# Patient Record
Sex: MALE | Race: WHITE | NOT HISPANIC OR LATINO | URBAN - METROPOLITAN AREA
[De-identification: names, ages, dates, MRNs, and addresses within clinical notes are randomized per-mention and may not be internally consistent; named-entity substitution may affect disease eponyms.]

---

## 2023-03-10 ENCOUNTER — TELEPHONE (OUTPATIENT)
Dept: PRIMARY CARE CLINIC | Facility: CLINIC | Age: 73
End: 2023-03-10
Payer: COMMERCIAL

## 2023-03-10 NOTE — TELEPHONE ENCOUNTER
----- Message from Rachele Scruggs sent at 3/10/2023  8:03 AM CST -----  Contact: Jose  Patient is calling to speak with the nurse regarding appt. Reports would like a sooner appt if possible. Please give patient a call back at .[phone].

## 2023-03-10 NOTE — TELEPHONE ENCOUNTER
----- Message from Bailey Marx sent at 3/10/2023  1:41 PM CST -----  .Type:  Patient Returning Call    Who Called:.Jose Zamora   Who Left Message for Patient:  Does the patient know what this is regarding?:  Would the patient rather a call back or a response via [facility]? Call back  Best Call Back Number:.[phone]   Additional Information:

## 2023-04-04 ENCOUNTER — HOSPITAL ENCOUNTER (OUTPATIENT)
Dept: RADIOLOGY | Facility: HOSPITAL | Age: 73
Discharge: HOME | End: 2023-04-04
Attending: FAMILY MEDICINE
Payer: COMMERCIAL

## 2023-04-04 DIAGNOSIS — Z01.818 ENCOUNTER FOR OTHER PREPROCEDURAL EXAMINATION: ICD-10-CM

## 2023-04-04 PROCEDURE — 71046 XR CHEST PA AND LATERAL: ICD-10-PCS | Mod: 26,,, | Performed by: RADIOLOGY

## 2023-04-04 PROCEDURE — 71046 X-RAY EXAM CHEST 2 VIEWS: CPT | Mod: TC,PN

## 2023-04-04 PROCEDURE — 71046 X-RAY EXAM CHEST 2 VIEWS: CPT | Mod: 26,,, | Performed by: RADIOLOGY

## 2023-07-14 ENCOUNTER — OFFICE VISIT (OUTPATIENT)
Dept: PRIMARY CARE CLINIC | Facility: CLINIC | Age: 73
End: 2023-07-14
Payer: COMMERCIAL

## 2023-07-14 VITALS
SYSTOLIC BLOOD PRESSURE: 130 MMHG | HEIGHT: 68 IN | WEIGHT: 149.94 LBS | RESPIRATION RATE: 18 BRPM | BODY MASS INDEX: 22.72 KG/M2 | HEART RATE: 60 BPM | TEMPERATURE: 98 F | OXYGEN SATURATION: 97 % | DIASTOLIC BLOOD PRESSURE: 82 MMHG

## 2023-07-14 DIAGNOSIS — K90.0 CELIAC DISEASE: Primary | ICD-10-CM

## 2023-07-14 DIAGNOSIS — I25.10 CORONARY ARTERY DISEASE INVOLVING NATIVE HEART WITHOUT ANGINA PECTORIS, UNSPECIFIED VESSEL OR LESION TYPE: ICD-10-CM

## 2023-07-14 DIAGNOSIS — Z85.850 HISTORY OF PAPILLARY ADENOCARCINOMA OF THYROID: ICD-10-CM

## 2023-07-14 DIAGNOSIS — K86.81 EXOCRINE PANCREATIC INSUFFICIENCY: ICD-10-CM

## 2023-07-14 DIAGNOSIS — R74.8 ELEVATED CPK: ICD-10-CM

## 2023-07-14 DIAGNOSIS — R79.89 ELEVATED TESTOSTERONE LEVEL: ICD-10-CM

## 2023-07-14 DIAGNOSIS — E78.2 MIXED HYPERLIPIDEMIA: ICD-10-CM

## 2023-07-14 DIAGNOSIS — D64.9 ANEMIA, UNSPECIFIED TYPE: ICD-10-CM

## 2023-07-14 DIAGNOSIS — E89.0 POSTOPERATIVE HYPOTHYROIDISM: ICD-10-CM

## 2023-07-14 PROCEDURE — 1125F AMNT PAIN NOTED PAIN PRSNT: CPT | Mod: CPTII,S$GLB,, | Performed by: FAMILY MEDICINE

## 2023-07-14 PROCEDURE — 1125F PR PAIN SEVERITY QUANTIFIED, PAIN PRESENT: ICD-10-PCS | Mod: CPTII,S$GLB,, | Performed by: FAMILY MEDICINE

## 2023-07-14 PROCEDURE — 99999 PR PBB SHADOW E&M-EST. PATIENT-LVL III: CPT | Mod: PBBFAC,,, | Performed by: FAMILY MEDICINE

## 2023-07-14 PROCEDURE — 3079F DIAST BP 80-89 MM HG: CPT | Mod: CPTII,S$GLB,, | Performed by: FAMILY MEDICINE

## 2023-07-14 PROCEDURE — 3075F SYST BP GE 130 - 139MM HG: CPT | Mod: CPTII,S$GLB,, | Performed by: FAMILY MEDICINE

## 2023-07-14 PROCEDURE — 3008F PR BODY MASS INDEX (BMI) DOCUMENTED: ICD-10-PCS | Mod: CPTII,S$GLB,, | Performed by: FAMILY MEDICINE

## 2023-07-14 PROCEDURE — 1159F PR MEDICATION LIST DOCUMENTED IN MEDICAL RECORD: ICD-10-PCS | Mod: CPTII,S$GLB,, | Performed by: FAMILY MEDICINE

## 2023-07-14 PROCEDURE — 1159F MED LIST DOCD IN RCRD: CPT | Mod: CPTII,S$GLB,, | Performed by: FAMILY MEDICINE

## 2023-07-14 PROCEDURE — 3079F PR MOST RECENT DIASTOLIC BLOOD PRESSURE 80-89 MM HG: ICD-10-PCS | Mod: CPTII,S$GLB,, | Performed by: FAMILY MEDICINE

## 2023-07-14 PROCEDURE — 3008F BODY MASS INDEX DOCD: CPT | Mod: CPTII,S$GLB,, | Performed by: FAMILY MEDICINE

## 2023-07-14 PROCEDURE — 99205 OFFICE O/P NEW HI 60 MIN: CPT | Mod: S$GLB,,, | Performed by: FAMILY MEDICINE

## 2023-07-14 PROCEDURE — 3075F PR MOST RECENT SYSTOLIC BLOOD PRESS GE 130-139MM HG: ICD-10-PCS | Mod: CPTII,S$GLB,, | Performed by: FAMILY MEDICINE

## 2023-07-14 PROCEDURE — 99999 PR PBB SHADOW E&M-EST. PATIENT-LVL III: ICD-10-PCS | Mod: PBBFAC,,, | Performed by: FAMILY MEDICINE

## 2023-07-14 PROCEDURE — 99205 PR OFFICE/OUTPT VISIT, NEW, LEVL V, 60-74 MIN: ICD-10-PCS | Mod: S$GLB,,, | Performed by: FAMILY MEDICINE

## 2023-07-14 RX ORDER — METOPROLOL TARTRATE 25 MG/1
12.5 TABLET, FILM COATED ORAL
COMMUNITY
Start: 2023-04-26

## 2023-07-14 RX ORDER — LEVOTHYROXINE SODIUM 13 UG/1
1 CAPSULE ORAL
COMMUNITY
Start: 2023-06-19

## 2023-07-14 NOTE — ASSESSMENT & PLAN NOTE
Last LDL was 130, certainly agree with getting the advanced lipids to further determine need for medication

## 2023-07-14 NOTE — ASSESSMENT & PLAN NOTE
Recently diagnosed, certainly think it is okay for him to take it the pancreatic enzymes but if he feels more comfortable starting with over-the-counter, you certainly can try that.  It is concerning that he is now having a low anemia which questions that he is having more malabsorptive type of symptoms.

## 2023-07-14 NOTE — ASSESSMENT & PLAN NOTE
Question son or dehydration effect versus red rice yeast.  Patient has been off the supplement for over a month, we will repeat labs

## 2023-07-14 NOTE — PROGRESS NOTES
"    [facility]       [address] [place] [address]      [phone]       [phone]     An Vasquez MD         .      Office Visit  07/14/2023  [id number]      SUBJECTIVE     HPI:  Jose Zamora is a 73 y.o. male presents today in clinic for "Establish Care  ."   Patient is here to establish care with me as a new patient.  I currently have seen his wife and daughter.  Has a long history of coronary disease status post double bypass.  This was found incidentally after he had surgery to remove his thyroid secondary to papillary thyroid cancer which appeared to be metastatic and aggressive.  He has tried repeatedly to take statin medications but has not been able to tolerate.  He recently started on red rice yeast and after a month, noticed you had more leg cramping and upon lab testing, it was noted that he had elevated CPK levels.  He is a runner and does stay very active so we can question if this was more related to dehydration and heat exhaustion? .  Patient is wanting a 2nd opinion on his overall cardiovascular health.  He brought in labs from his old doctor for me to review.  He eats very clean as he has celiac disease and stays on a gluten free diet.  Lab IgA testing confirms he is not getting any gluten in his diet.  He had started with some loose stools that is started a few months ago, and upon testing, it was found that he had low pancreatic elastase and was started on Creon.  Patient was a little reluctant to start, so he has not started yet.  He does think maybe some foods trigger this.  He was also shown to be slightly anemic which has not been an issue previously.  No further evaluation was done at that point.  He is fasting today and would like to get labs      ROS   Review of symptoms are negative except as noted.     PAST MEDICAL HISTORY     Past Medical History:   Diagnosis Date    Celiac disease     Coronary artery disease     Hypothyroidism  " "      Past Surgical History:   Procedure Laterality Date    ARTERIAL BYPASS SURGRY  2017    SHOULDER SURGERY Right 04/2023    THYROIDECTOMY  2016    TUMOR REMOVAL  2003    stomach       Social History     Socioeconomic History    Marital status:    Tobacco Use    Smoking status: Never     Passive exposure: Never    Smokeless tobacco: Never   Substance and Sexual Activity    Alcohol use: Yes    Drug use: Never       Allergies as of 07/14/2023 - Reviewed 07/14/2023   Allergen Reaction Noted    Statins-hmg-coa reductase inhibitors Other (See Comments) 04/22/2021    Gluten Diarrhea 10/26/2016       HOME MEDS     Current Outpatient Medications   Medication Instructions    metoprolol tartrate (LOPRESSOR) 12.5 mg, Oral    TIROSINT 150 mcg Cap 1 capsule, Oral       The following were updated and reviewed by myself in the chart: medications, past medical history, past surgical history, family history, social history, and allergies.        Objective    OBJECTIVE   /82   Pulse 60   Temp 97.9 °F (36.6 °C)   Resp 18   Ht 5' 8" (1.727 m)   Wt 68 kg (149 lb 14.6 oz)   SpO2 97%   BMI 22.79 kg/m²     Wt Readings from Last 2 Encounters:   07/14/23 68 kg (149 lb 14.6 oz)       BP Readings from Last 2 Encounters:   07/14/23 130/82          Physical Exam  Vitals and nursing note reviewed.   Constitutional:       Appearance: Normal appearance. He is normal weight.   HENT:      Head: Normocephalic and atraumatic.      Nose: Nose normal.      Mouth/Throat:      Mouth: Mucous membranes are dry.   Eyes:      General: Lids are normal.      Conjunctiva/sclera: Conjunctivae normal.      Pupils: Pupils are equal, round, and reactive to light.   Neck:      Thyroid: No thyromegaly or thyroid tenderness.   Cardiovascular:      Rate and Rhythm: Normal rate and regular rhythm.      Pulses: Normal pulses.   Pulmonary:      Effort: Pulmonary effort is normal.      Breath sounds: Normal breath sounds.   Abdominal:      General: " Abdomen is flat.      Palpations: Abdomen is soft.      Tenderness: There is no abdominal tenderness.   Musculoskeletal:         General: Normal range of motion.      Cervical back: Normal range of motion and neck supple. No muscular tenderness.   Skin:     General: Skin is warm and dry.      Findings: No lesion or rash.   Neurological:      General: No focal deficit present.      Mental Status: He is alert and oriented to person, place, and time.      Cranial Nerves: Cranial nerves 2-12 are intact.      Sensory: Sensation is intact.      Motor: Motor function is intact.      Coordination: Coordination is intact.      Gait: Gait is intact.   Psychiatric:         Attention and Perception: Attention normal. He is attentive.         Mood and Affect: Mood normal. Mood is not anxious or depressed. Affect is not tearful.         Speech: Speech is not rapid and pressured.         Behavior: Behavior normal. Behavior is not slowed or hyperactive.             LABS      LAB RESULTS, IF AVAILABLE, ARE DISCUSSED WITH PATIENT AND POSTED TO PATIENT PORTAL     ASSESSMENT & PLAN     1. Celiac disease  Assessment & Plan:  Adheres to a gluten free diet.  IgA antibodies were negative      2. Coronary artery disease involving native heart without angina pectoris, unspecified vessel or lesion type  Assessment & Plan:  Now seeing Dr. López, we will get advanced lipids with inflammatory markers to determine his best need for medication and treatment    Orders:  -     Misc Sendout Test, Blood Advanced Lipid Panel, Cardio IQ ® 16207; Future; Expected date: 07/14/2023  -     Misc Sendout Test, Blood Cardio IQ hs-CRP 30067; Future; Expected date: 07/14/2023  -     Cardio Iq(R) Apolipoprotein B; Future; Expected date: 07/14/2023  -     Misc Sendout Test, Blood Cardio IQ® Lipoprotein (a) 32586; Future; Expected date: 07/14/2023    3. Elevated CPK  Assessment & Plan:  Question son or dehydration effect versus red rice yeast.  Patient has been  off the supplement for over a month, we will repeat labs    Orders:  -     CK; Future; Expected date: 07/14/2023    4. History of papillary adenocarcinoma of thyroid  Overview:  Status post removal in 2016 in [place]      5. Postoperative hypothyroidism  Overview:  Patient advised to comply with thyroid medications as directed. Patient should take thyroid meds on empty stomach and avoid taking with iron, calcium, zinc and fiber.  Potential risks associated with suppressing TSH (increased arrhythmia and bone loss) can occur as a result of thyroid replacement therapy. Emphasis is  on improving patient symptoms. Patient should notify us if any symptoms occur suggestive of overactive thyroid (fast heart rate, anxiety, sleeplessness, and tremors). Routine follow up recommended       Assessment & Plan:  We will look at entire incomplete thyroid lab.  Previous antibody tests were normal.    Orders:  -     TSH; Future; Expected date: 07/14/2023  -     T4, Free; Future; Expected date: 07/14/2023  -     T3, Free; Future; Expected date: 07/14/2023    6. Mixed hyperlipidemia  Overview:  Reviewed importance of advanced lipid profiles. Advise daily exercise. Diet is recommended. Patients are encouraged to obtain healthy BMI weight. Risks associated with high cholesterol are well established and include but are not limited to heart disease, stroke and peripheral vascular disease. Patient should not smoke. Goal LDL particle number is <1000 and ApoB <70. Basic LDL is below 100 or below 70 if diabetic. Some non-FDA approved dietary supplements that may be beneficial to patient include but is not limited to high fiber diet at least 30g daily; niacin ER non flush free variety 500mg-1,000mg; Omega-3 fish oil DHA+EPA = 1,000mg twice daily; baby dose aspirin 81mg; co-enzyme q10 500mg to help reduce statin-induced myalgia; red rice yeast 1200mg twice daily; berberine 1000mg-2000mg daily. Patient is encouraged to exercise routinely and adhere  to heart healthy diet with Mediterranean diet showing the most consistent data to help with lipid management.      Assessment & Plan:  Last LDL was 130, certainly agree with getting the advanced lipids to further determine need for medication    Orders:  -     Misc Sendout Test, Blood Advanced Lipid Panel, Cardio IQ ® 38799; Future; Expected date: 07/14/2023  -     Misc Sendout Test, Blood Cardio IQ hs-CRP 29398; Future; Expected date: 07/14/2023  -     Cardio Iq(R) Apolipoprotein B; Future; Expected date: 07/14/2023  -     Misc Sendout Test, Blood Cardio IQ® Lipoprotein (a) 74970; Future; Expected date: 07/14/2023  -     CBC Auto Differential; Future; Expected date: 07/14/2023    7. Exocrine pancreatic insufficiency  Assessment & Plan:  Recently diagnosed, certainly think it is okay for him to take it the pancreatic enzymes but if he feels more comfortable starting with over-the-counter, you certainly can try that.  It is concerning that he is now having a low anemia which questions that he is having more malabsorptive type of symptoms.    Orders:  -     Insulin, Random; Future; Expected date: 07/14/2023  -     Hemoglobin A1C; Future; Expected date: 07/14/2023    8. Elevated testosterone level  Assessment & Plan:  Last testosterone was over 900 which is unusually elevated given his age.  We will get free and total along with SHBG.  He is taking a testosterone booster supplement    Orders:  -     Testosterone, Free; Future; Expected date: 07/14/2023  -     Testosterone, Total, LC/MS/MS; Future; Expected date: 07/14/2023  -     SEX HORMONE BINDING GLOBULIN; Future; Expected date: 07/14/2023    9. Anemia, unspecified type  Assessment & Plan:  Unknown source, we will look at full panel to reassess need for treatment    Orders:  -     Ferritin; Future; Expected date: 07/14/2023  -     Folate; Future; Expected date: 07/14/2023  -     Iron; Future; Expected date: 07/14/2023  -     Vitamin B12; Future; Expected date:  "07/14/2023          I spent a total of 60 minutes face to face and non-face to face on the date of this visit.This includes time preparing to see the patient (eg, review of tests, notes), obtaining and/or reviewing additional history from an independent historian and/or outside medical records, documenting clinical information in the electronic health record, independently interpreting results and/or communicating results to the patient/family/caregiver, or care coordinator.      Disclaimer: Portions of this record may have been created with voice recognition software. Occasional wrong-word or "sound-a-like" substitutions may have occurred due to inherent limitations of voice recognition software. Read the chart carefully and recognize, using context, where substitutions have occurred."    Signed by:  An Vasquez MD           "

## 2023-07-14 NOTE — ASSESSMENT & PLAN NOTE
Last testosterone was over 900 which is unusually elevated given his age.  We will get free and total along with SHBG.  He is taking a testosterone booster supplement

## 2023-07-14 NOTE — ASSESSMENT & PLAN NOTE
Now seeing Dr. López, we will get advanced lipids with inflammatory markers to determine his best need for medication and treatment

## 2023-07-17 LAB
BASOPHILS # BLD AUTO: 41 CELLS/UL (ref 0–200)
BASOPHILS NFR BLD AUTO: 0.9 %
CK SERPL-CCNC: 246 U/L (ref 44–196)
CRP SERPL HS-MCNC: 0.3 MG/L
EOSINOPHIL # BLD AUTO: 59 CELLS/UL (ref 15–500)
EOSINOPHIL NFR BLD AUTO: 1.3 %
ERYTHROCYTE [DISTWIDTH] IN BLOOD BY AUTOMATED COUNT: 12.4 % (ref 11–15)
FERRITIN SERPL-MCNC: 50 NG/ML (ref 24–380)
FOLATE SERPL-MCNC: >24 NG/ML
HBA1C MFR BLD: 5.2 % OF TOTAL HGB
HCT VFR BLD AUTO: 40 % (ref 38.5–50)
HGB BLD-MCNC: 13.5 G/DL (ref 13.2–17.1)
IRON SERPL-MCNC: 88 MCG/DL (ref 50–180)
LYMPHOCYTES # BLD AUTO: 1202 CELLS/UL (ref 850–3900)
LYMPHOCYTES NFR BLD AUTO: 26.7 %
MCH RBC QN AUTO: 31.8 PG (ref 27–33)
MCHC RBC AUTO-ENTMCNC: 33.8 G/DL (ref 32–36)
MCV RBC AUTO: 94.1 FL (ref 80–100)
MONOCYTES # BLD AUTO: 266 CELLS/UL (ref 200–950)
MONOCYTES NFR BLD AUTO: 5.9 %
NEUTROPHILS # BLD AUTO: 2934 CELLS/UL (ref 1500–7800)
NEUTROPHILS NFR BLD AUTO: 65.2 %
PLATELET # BLD AUTO: 223 THOUSAND/UL (ref 140–400)
PMV BLD REES-ECKER: 9.6 FL (ref 7.5–12.5)
RBC # BLD AUTO: 4.25 MILLION/UL (ref 4.2–5.8)
T4 FREE SERPL-MCNC: 1.7 NG/DL (ref 0.8–1.8)
TESTOST SERPL-MCNC: 803 NG/DL (ref 250–1100)
TSH SERPL-ACNC: 0.5 MIU/L (ref 0.4–4.5)
VIT B12 SERPL-MCNC: 904 PG/ML (ref 200–1100)
WBC # BLD AUTO: 4.5 THOUSAND/UL (ref 3.8–10.8)

## 2023-07-19 LAB
APO B SERPL-MCNC: 111 MG/DL
CHOLEST SERPL-MCNC: 196 MG/DL
CHOLEST/HDLC SERPL: 3.5 CALC
HDL ALPHA 3 SER-SCNC: 507 NMOL/L
HDLC SERPL-MCNC: 56 MG/DL
HLD.LARGE SERPL-SCNC: 5664 NMOL/L
INSULIN SERPL-ACNC: 2.9 UIU/ML
LDL SERPL QN: 219.3 ANGSTROM
LDL SERPL-SCNC: 2223 NMOL/L
LDL SMALL SERPL-SCNC: 377 NMOL/L
LDLC REAL SIZE PAT SERPL: ABNORMAL PATTERN
LDLC SERPL CALC-MCNC: 125 MG/DL (CALC)
LPA SERPL-SCNC: 182 NMOL/L
NONHDLC SERPL-MCNC: 140 MG/DL (CALC)
SHBG SERPL-SCNC: 137 NMOL/L (ref 22–77)
T3FREE SERPL-MCNC: 3.7 PG/ML (ref 2.3–4.2)
TESTOST FREE SERPL-MCNC: 33.2 PG/ML (ref 6–73)
TRIGL SERPL-MCNC: 54 MG/DL

## 2023-07-23 LAB
AA+LINOLEATE/FATTY ACIDS.C14-C22 MFR BLD: 38.9 % BY WT
AA/EPA BLD: 6.2 {RATIO} (ref 3.7–40.7)
AA/FATTY ACIDS.C14-C22 MFR BLD: 10.7 % BY WT (ref 8.6–15.6)
APOE GENE MUT ANL BLD/T: NORMAL
DHA/FATTY ACIDS.C14-C22 MFR BLD: 3.5 % BY WT (ref 1.4–5.1)
DPA/FATTY ACIDS.C14-C22 MFR BLD: 1.4 % BY WT (ref 0.8–1.8)
EPA+DPA+DHA/FA.C14-C22 RISK BLD IMP: 6.6 % BY WT
EPA+DPA+DHA/FATTY ACIDS.C14-C22 MFR BLD: 6.6 % BY WT
EPA/FATTY ACIDS.C14-C22 MFR BLD: 1.7 % BY WT (ref 0.2–2.3)
LABORATORY COMMENT REPORT: NORMAL
LDL.OXIDIZED [UNITS/VOLUME] IN SERUM OR PLASMA: 50 U/L
LINOLEATE/FATTY ACIDS.C14-C22 MFR BLD: 26.3 % BY WT (ref 18.6–29.5)
LP-PLA2 SERPL-CCNC: 113 NMOL/MIN/ML
LPA SERPL-SCNC: 182 NMOL/L
Lab: NORMAL
MEDICAL DIRECTOR REVIEW: NORMAL
MEDICAL DIRECTOR REVIEW: NORMAL
MYELOPEROXIDASE PLAS-SCNC: 162 PMOL/L
REF LAB TEST METHOD: NORMAL
REF LAB TEST METHOD: NORMAL
SERVICE CMNT-IMP: NORMAL
SERVICE CMNT-IMP: NORMAL
TRIMETHYLAMINE N-OXIDE [MOLES/VOLUME] IN SERUM OR PLASMA BY LC/MS/MS: 9.1 UM
W6 FA/W3 FA BLD: 5.9 {RATIO} (ref 3.7–14.4)

## 2023-07-31 ENCOUNTER — TELEPHONE (OUTPATIENT)
Dept: PRIMARY CARE CLINIC | Facility: CLINIC | Age: 73
End: 2023-07-31
Payer: COMMERCIAL

## 2023-07-31 NOTE — TELEPHONE ENCOUNTER
----- Message from An Vasquez MD sent at 7/31/2023  7:12 AM CDT -----  See if we can add him to telemed schedule this week- ok to add on at 230.  I may be still missing 3 of the labs

## 2023-08-03 ENCOUNTER — PATIENT MESSAGE (OUTPATIENT)
Dept: RESEARCH | Facility: HOSPITAL | Age: 73
End: 2023-08-03

## 2023-08-04 ENCOUNTER — OFFICE VISIT (OUTPATIENT)
Dept: PRIMARY CARE CLINIC | Facility: CLINIC | Age: 73
End: 2023-08-04
Payer: COMMERCIAL

## 2023-08-04 VITALS
RESPIRATION RATE: 18 BRPM | DIASTOLIC BLOOD PRESSURE: 68 MMHG | OXYGEN SATURATION: 99 % | BODY MASS INDEX: 22.49 KG/M2 | TEMPERATURE: 98 F | HEART RATE: 70 BPM | SYSTOLIC BLOOD PRESSURE: 124 MMHG | WEIGHT: 148.38 LBS | HEIGHT: 68 IN

## 2023-08-04 DIAGNOSIS — I25.10 CORONARY ARTERY DISEASE INVOLVING NATIVE CORONARY ARTERY OF NATIVE HEART WITHOUT ANGINA PECTORIS: Primary | ICD-10-CM

## 2023-08-04 DIAGNOSIS — E78.2 MIXED HYPERLIPIDEMIA: ICD-10-CM

## 2023-08-04 DIAGNOSIS — R79.89 LOW TESTOSTERONE IN MALE: ICD-10-CM

## 2023-08-04 DIAGNOSIS — E89.0 POSTOPERATIVE HYPOTHYROIDISM: ICD-10-CM

## 2023-08-04 DIAGNOSIS — R74.8 ELEVATED CPK: ICD-10-CM

## 2023-08-04 PROCEDURE — 1159F MED LIST DOCD IN RCRD: CPT | Mod: CPTII,S$GLB,, | Performed by: FAMILY MEDICINE

## 2023-08-04 PROCEDURE — 99999 PR PBB SHADOW E&M-EST. PATIENT-LVL III: ICD-10-PCS | Mod: PBBFAC,,, | Performed by: FAMILY MEDICINE

## 2023-08-04 PROCEDURE — 3044F HG A1C LEVEL LT 7.0%: CPT | Mod: CPTII,S$GLB,, | Performed by: FAMILY MEDICINE

## 2023-08-04 PROCEDURE — 99215 PR OFFICE/OUTPT VISIT, EST, LEVL V, 40-54 MIN: ICD-10-PCS | Mod: S$GLB,,, | Performed by: FAMILY MEDICINE

## 2023-08-04 PROCEDURE — 3008F PR BODY MASS INDEX (BMI) DOCUMENTED: ICD-10-PCS | Mod: CPTII,S$GLB,, | Performed by: FAMILY MEDICINE

## 2023-08-04 PROCEDURE — 3008F BODY MASS INDEX DOCD: CPT | Mod: CPTII,S$GLB,, | Performed by: FAMILY MEDICINE

## 2023-08-04 PROCEDURE — 1159F PR MEDICATION LIST DOCUMENTED IN MEDICAL RECORD: ICD-10-PCS | Mod: CPTII,S$GLB,, | Performed by: FAMILY MEDICINE

## 2023-08-04 PROCEDURE — 3074F PR MOST RECENT SYSTOLIC BLOOD PRESSURE < 130 MM HG: ICD-10-PCS | Mod: CPTII,S$GLB,, | Performed by: FAMILY MEDICINE

## 2023-08-04 PROCEDURE — 99215 OFFICE O/P EST HI 40 MIN: CPT | Mod: S$GLB,,, | Performed by: FAMILY MEDICINE

## 2023-08-04 PROCEDURE — 3044F PR MOST RECENT HEMOGLOBIN A1C LEVEL <7.0%: ICD-10-PCS | Mod: CPTII,S$GLB,, | Performed by: FAMILY MEDICINE

## 2023-08-04 PROCEDURE — 99999 PR PBB SHADOW E&M-EST. PATIENT-LVL III: CPT | Mod: PBBFAC,,, | Performed by: FAMILY MEDICINE

## 2023-08-04 PROCEDURE — 3074F SYST BP LT 130 MM HG: CPT | Mod: CPTII,S$GLB,, | Performed by: FAMILY MEDICINE

## 2023-08-04 PROCEDURE — 3078F PR MOST RECENT DIASTOLIC BLOOD PRESSURE < 80 MM HG: ICD-10-PCS | Mod: CPTII,S$GLB,, | Performed by: FAMILY MEDICINE

## 2023-08-04 PROCEDURE — 3078F DIAST BP <80 MM HG: CPT | Mod: CPTII,S$GLB,, | Performed by: FAMILY MEDICINE

## 2023-08-04 PROCEDURE — 1126F PR PAIN SEVERITY QUANTIFIED, NO PAIN PRESENT: ICD-10-PCS | Mod: CPTII,S$GLB,, | Performed by: FAMILY MEDICINE

## 2023-08-04 PROCEDURE — 1126F AMNT PAIN NOTED NONE PRSNT: CPT | Mod: CPTII,S$GLB,, | Performed by: FAMILY MEDICINE

## 2023-08-04 RX ORDER — EVOLOCUMAB 140 MG/ML
140 INJECTION, SOLUTION SUBCUTANEOUS
COMMUNITY
Start: 2023-07-25

## 2023-08-04 RX ORDER — VALACYCLOVIR HYDROCHLORIDE 1 G/1
1000 TABLET, FILM COATED ORAL
COMMUNITY
Start: 2023-07-26

## 2023-08-04 RX ORDER — PANCRELIPASE 36000; 180000; 114000 [USP'U]/1; [USP'U]/1; [USP'U]/1
1 CAPSULE, DELAYED RELEASE PELLETS ORAL 3 TIMES DAILY
COMMUNITY
Start: 2023-07-12

## 2023-08-04 RX ORDER — TESTOSTERONE 5.5 MG/.122G
1 GEL, METERED NASAL 3 TIMES DAILY
Qty: 3 EACH | Refills: 5 | Status: SHIPPED | OUTPATIENT
Start: 2023-08-04 | End: 2023-10-16 | Stop reason: SDUPTHER

## 2023-08-04 NOTE — PROGRESS NOTES
"    [facility]       [address] [place] [address]      [phone]     An Vasquez MD         .      Office Visit  08/04/2023  [id number]      SUBJECTIVE     HPI:  Jose Zamora is a 73 y.o. male presents today in clinic for "Follow-up  ."   Patient is here for new patient lab follow-up.  His main concern is his cholesterol in his overall risk for cardiovascular disease.  REM mindful, he is already had coronary disease.  He also has a history of celiac disease and hypothyroidism.  He stays on a very clean and active lifestyle and his weight has been very stable.        ROS   Review of symptoms are negative except as noted.     PAST MEDICAL HISTORY     Past Medical History:   Diagnosis Date    Celiac disease     Coronary artery disease     Hypothyroidism        Past Surgical History:   Procedure Laterality Date    ARTERIAL BYPASS SURGRY  2017    SHOULDER SURGERY Right 04/2023    THYROIDECTOMY  2016    TUMOR REMOVAL  2003    stomach       Social History     Socioeconomic History    Marital status:    Tobacco Use    Smoking status: Never     Passive exposure: Never    Smokeless tobacco: Never   Substance and Sexual Activity    Alcohol use: Yes    Drug use: Never       Allergies as of 08/04/2023 - Reviewed 08/04/2023   Allergen Reaction Noted    Statins-hmg-coa reductase inhibitors Other (See Comments) 04/22/2021    Gluten Diarrhea 10/26/2016       HOME MEDS     Current Outpatient Medications   Medication Instructions    CREON 36,000-114,000- 180,000 unit CpDR 1 capsule, Oral, 3 times daily    metoprolol tartrate (LOPRESSOR) 12.5 mg, Oral    REPATHA SURECLICK 140 mg, Subcutaneous, Every 14 days    testosterone (NATESTO) 5.5 mg/0.122 gram/actuation GlPm 1 Application, Nasal, 3 times daily    TIROSINT 150 mcg Cap 1 capsule, Oral    valACYclovir (VALTREX) 1,000 mg, Oral       The following were updated and reviewed by myself in the chart: " "medications, past medical history, past surgical history, family history, social history, and allergies.        Objective    OBJECTIVE   /68   Pulse 70   Temp 97.8 °F (36.6 °C)   Resp 18   Ht 5' 8" (1.727 m)   Wt 67.3 kg (148 lb 5.9 oz)   SpO2 99%   BMI 22.56 kg/m²     Wt Readings from Last 2 Encounters:   08/04/23 67.3 kg (148 lb 5.9 oz)   07/14/23 68 kg (149 lb 14.6 oz)       BP Readings from Last 2 Encounters:   08/04/23 124/68   07/14/23 130/82          Physical Exam  Vitals and nursing note reviewed.   Constitutional:       Appearance: Normal appearance. He is normal weight.   HENT:      Head: Normocephalic and atraumatic.      Nose: Nose normal.      Mouth/Throat:      Mouth: Mucous membranes are dry.   Eyes:      General: Lids are normal.      Conjunctiva/sclera: Conjunctivae normal.      Pupils: Pupils are equal, round, and reactive to light.   Neck:      Thyroid: No thyromegaly or thyroid tenderness.   Cardiovascular:      Rate and Rhythm: Normal rate and regular rhythm.      Pulses: Normal pulses.   Pulmonary:      Effort: Pulmonary effort is normal.      Breath sounds: Normal breath sounds.   Abdominal:      General: Abdomen is flat.      Palpations: Abdomen is soft.      Tenderness: There is no abdominal tenderness.   Musculoskeletal:         General: Normal range of motion.      Cervical back: Normal range of motion and neck supple. No muscular tenderness.   Skin:     General: Skin is warm and dry.      Findings: No lesion or rash.   Neurological:      General: No focal deficit present.      Mental Status: He is alert and oriented to person, place, and time.      Cranial Nerves: Cranial nerves 2-12 are intact.      Sensory: Sensation is intact.      Motor: Motor function is intact.      Coordination: Coordination is intact.      Gait: Gait is intact.   Psychiatric:         Attention and Perception: Attention normal. He is attentive.         Mood and Affect: Mood normal. Mood is not anxious " or depressed. Affect is not tearful.         Speech: Speech is not rapid and pressured.         Behavior: Behavior normal. Behavior is not slowed or hyperactive.               LABS      LAB RESULTS, IF AVAILABLE, ARE DISCUSSED WITH PATIENT AND POSTED TO PATIENT PORTAL     ASSESSMENT & PLAN     1. Coronary artery disease involving native coronary artery of native heart without angina pectoris    2. Elevated CPK    3. Mixed hyperlipidemia  Overview:  Reviewed importance of advanced lipid profiles. Advise daily exercise. Diet is recommended. Patients are encouraged to obtain healthy BMI weight. Risks associated with high cholesterol are well established and include but are not limited to heart disease, stroke and peripheral vascular disease. Patient should not smoke. Goal LDL particle number is <1000 and ApoB <70. Basic LDL is below 100 or below 70 if diabetic. Some non-FDA approved dietary supplements that may be beneficial to patient include but is not limited to high fiber diet at least 30g daily; niacin ER non flush free variety 500mg-1,000mg; Omega-3 fish oil DHA+EPA = 1,000mg twice daily; baby dose aspirin 81mg; co-enzyme q10 500mg to help reduce statin-induced myalgia; red rice yeast 1200mg twice daily; berberine 1000mg-2000mg daily. Patient is encouraged to exercise routinely and adhere to heart healthy diet with Mediterranean diet showing the most consistent data to help with lipid management.        4. Postoperative hypothyroidism  Overview:  Patient advised to comply with thyroid medications as directed. Patient should take thyroid meds on empty stomach and avoid taking with iron, calcium, zinc and fiber.  Potential risks associated with suppressing TSH (increased arrhythmia and bone loss) can occur as a result of thyroid replacement therapy. Emphasis is  on improving patient symptoms. Patient should notify us if any symptoms occur suggestive of overactive thyroid (fast heart rate, anxiety, sleeplessness,  "and tremors). Routine follow up recommended         5. Low testosterone in male  -     testosterone (NATESTO) 5.5 mg/0.122 gram/actuation GlPm; 1 Application by Nasal route 3 (three) times daily.  Dispense: 3 each; Refill: 5      Discussed at length the advanced lipid profile.  Based on his previous history, elevated particle number, elevated APO B, and elevated LPA, agree to start Repatha.    Concerned about this persistent increase in CPK though it has improved.  Advised patient to make sure he is staying hydrated, avoid intense exercise or outdoor activity prior to doing labs and then we will look at more of the CK panel to further assess source of this elevation.  Question if there is a need for a muscle biopsy in the future.    Free testosterone, elevated total testosterone- low t symptoms present- suspect total is elevated due to elevated SHBG related to overactive thyroid and age.  Agree to start a low dose booster testosterone that has very minimal effect on aromatase cessation and polycythemia.  Also advised patient to start saw palmetto to help with DHT conversion.  We will plan to look at labs in two months and if not feeling any better with energy and motivation, okay to start    I spent a total of 45 minutes face to face and non-face to face on the date of this visit.This includes time preparing to see the patient (eg, review of tests, notes), obtaining and/or reviewing additional history from an independent historian and/or outside medical records, documenting clinical information in the electronic health record, independently interpreting results and/or communicating results to the patient/family/caregiver, or care coordinator.      Disclaimer: Portions of this record may have been created with voice recognition software. Occasional wrong-word or "sound-a-like" substitutions may have occurred due to inherent limitations of voice recognition software. Read the chart carefully and recognize, using " "context, where substitutions have occurred."    Signed by:  An Vasquez MD           "

## 2023-09-25 LAB
ALBUMIN SERPL-MCNC: 4.4 G/DL (ref 3.6–5.1)
ALBUMIN/GLOB SERPL: 2.4 (CALC) (ref 1–2.5)
ALP SERPL-CCNC: 33 U/L (ref 35–144)
ALT SERPL-CCNC: 22 U/L (ref 9–46)
AST SERPL-CCNC: 22 U/L (ref 10–35)
BILIRUB SERPL-MCNC: 0.5 MG/DL (ref 0.2–1.2)
BUN SERPL-MCNC: 23 MG/DL (ref 7–25)
BUN/CREAT SERPL: ABNORMAL (CALC) (ref 6–22)
CALCIUM SERPL-MCNC: 9.2 MG/DL (ref 8.6–10.3)
CHLORIDE SERPL-SCNC: 105 MMOL/L (ref 98–110)
CO2 SERPL-SCNC: 29 MMOL/L (ref 20–32)
CREAT SERPL-MCNC: 0.83 MG/DL (ref 0.7–1.28)
EGFR: 92 ML/MIN/1.73M2
GLOBULIN SER CALC-MCNC: 1.8 G/DL (CALC) (ref 1.9–3.7)
GLUCOSE SERPL-MCNC: 103 MG/DL (ref 65–99)
POTASSIUM SERPL-SCNC: 4.2 MMOL/L (ref 3.5–5.3)
PROT SERPL-MCNC: 6.2 G/DL (ref 6.1–8.1)
SODIUM SERPL-SCNC: 141 MMOL/L (ref 135–146)
TESTOST SERPL-MCNC: 747 NG/DL (ref 250–1100)

## 2023-10-05 LAB
ANDROSTANOLONE SERPL-MCNC: 152 NG/DL (ref 12–65)
APO B SERPL-MCNC: 70 MG/DL
CHOLEST SERPL-MCNC: 153 MG/DL
CHOLEST/HDLC SERPL: 2.3 CALC
CK BB CFR SERPL ELPH: NORMAL % OF TOTAL
CK MB CFR SERPL ELPH: 0 % OF TOTAL
CK MM CFR SERPL ELPH: 100 % OF TOTAL (ref 95–100)
ERYTHROCYTE [DISTWIDTH] IN BLOOD BY AUTOMATED COUNT: 12.5 % (ref 11–15)
ESTROGEN SERPL-MCNC: 95 PG/ML
HCT VFR BLD AUTO: 45.5 % (ref 38.5–50)
HDL ALPHA 3 SER-SCNC: 243 NMOL/L
HDLC SERPL-MCNC: 68 MG/DL
HGB BLD-MCNC: 15.1 G/DL (ref 13.2–17.1)
HLD.LARGE SERPL-SCNC: 8674 NMOL/L
LDL SERPL QN: 220.5 ANGSTROM
LDL SERPL-SCNC: 1504 NMOL/L
LDL SMALL SERPL-SCNC: 242 NMOL/L
LDLC REAL SIZE PAT SERPL: ABNORMAL PATTERN
LDLC SERPL CALC-MCNC: 69 MG/DL (CALC)
LPA SERPL-SCNC: 136 NMOL/L
MCH RBC QN AUTO: 31.5 PG (ref 27–33)
MCHC RBC AUTO-ENTMCNC: 33.2 G/DL (ref 32–36)
MCV RBC AUTO: 95 FL (ref 80–100)
NONHDLC SERPL-MCNC: 85 MG/DL (CALC)
PLATELET # BLD AUTO: 229 THOUSAND/UL (ref 140–400)
PMV BLD REES-ECKER: 9.8 FL (ref 7.5–12.5)
RBC # BLD AUTO: 4.79 MILLION/UL (ref 4.2–5.8)
SHBG SERPL-SCNC: 115 NMOL/L (ref 22–77)
TESTOST FREE SERPL-MCNC: 28.8 PG/ML (ref 6–73)
TRIGL SERPL-MCNC: 77 MG/DL
WBC # BLD AUTO: 5.7 THOUSAND/UL (ref 3.8–10.8)

## 2023-10-16 DIAGNOSIS — R79.89 LOW TESTOSTERONE IN MALE: ICD-10-CM

## 2023-10-16 RX ORDER — TESTOSTERONE 5.5 MG/.122G
1 GEL, METERED NASAL 3 TIMES DAILY
Qty: 3 EACH | Refills: 5 | Status: SHIPPED | OUTPATIENT
Start: 2023-10-16

## 2023-10-17 ENCOUNTER — TELEPHONE (OUTPATIENT)
Dept: PRIMARY CARE CLINIC | Facility: CLINIC | Age: 73
End: 2023-10-17
Payer: COMMERCIAL

## 2024-01-11 ENCOUNTER — TELEPHONE (OUTPATIENT)
Dept: PRIMARY CARE CLINIC | Facility: CLINIC | Age: 74
End: 2024-01-11
Payer: COMMERCIAL

## 2024-01-11 ENCOUNTER — PATIENT MESSAGE (OUTPATIENT)
Dept: PRIMARY CARE CLINIC | Facility: CLINIC | Age: 74
End: 2024-01-11
Payer: COMMERCIAL

## 2024-01-11 DIAGNOSIS — E89.0 POSTOPERATIVE HYPOTHYROIDISM: ICD-10-CM

## 2024-01-11 DIAGNOSIS — R74.8 ELEVATED CPK: ICD-10-CM

## 2024-01-11 DIAGNOSIS — R79.89 LOW TESTOSTERONE IN MALE: Primary | ICD-10-CM

## 2024-01-11 DIAGNOSIS — I25.10 CORONARY ARTERY DISEASE INVOLVING NATIVE CORONARY ARTERY OF NATIVE HEART WITHOUT ANGINA PECTORIS: ICD-10-CM

## 2024-01-11 NOTE — TELEPHONE ENCOUNTER
----- Message from Rahul Pablo sent at 1/11/2024  1:32 PM CST -----  Regarding: Advice  Contact: [phone]  Patient is calling to speak with someone in office needing to redo blood no orders has been sent to [facility]. Please contact pt

## 2024-01-17 LAB
T4 FREE SERPL-MCNC: 1.5 NG/DL (ref 0.8–1.8)
TESTOST SERPL-MCNC: 712 NG/DL (ref 250–1100)
TSH SERPL-ACNC: 1.74 MIU/L (ref 0.4–4.5)

## 2024-01-18 LAB — CRP SERPL HS-MCNC: >10 MG/L

## 2024-01-19 LAB
CK BB CFR SERPL ELPH: ABNORMAL % OF TOTAL
CK ISOS SERPL-IMP: ABNORMAL
CK MB CFR SERPL ELPH: 0 % OF TOTAL
CK MM CFR SERPL ELPH: 96 % OF TOTAL (ref 95–100)
CK SERPL-CCNC: 252 U/L (ref 44–196)

## 2024-01-22 LAB
APO B SERPL-MCNC: 68 MG/DL
CHOLEST SERPL-MCNC: 142 MG/DL
CHOLEST/HDLC SERPL: 2.5 CALC
ESTROGEN SERPL-MCNC: 149 PG/ML
HDL ALPHA 3 SER-SCNC: 200 NMOL/L
HDLC SERPL-MCNC: 57 MG/DL
HLD.LARGE SERPL-SCNC: 6803 NMOL/L
INSULIN SERPL-ACNC: 10.3 UIU/ML
LDL SERPL QN: 218.7 ANGSTROM
LDL SERPL-SCNC: 1158 NMOL/L
LDL SMALL SERPL-SCNC: 196 NMOL/L
LDLC REAL SIZE PAT SERPL: ABNORMAL PATTERN
LDLC SERPL CALC-MCNC: 68 MG/DL (CALC)
LPA SERPL-SCNC: 167 NMOL/L
NONHDLC SERPL-MCNC: 85 MG/DL (CALC)
SHBG SERPL-SCNC: 99 NMOL/L (ref 22–77)
T3FREE SERPL-MCNC: 3.3 PG/ML (ref 2.3–4.2)
TESTOST FREE SERPL-MCNC: 35.9 PG/ML (ref 6–73)
TRIGL SERPL-MCNC: 91 MG/DL

## 2024-02-12 ENCOUNTER — PATIENT MESSAGE (OUTPATIENT)
Dept: PRIMARY CARE CLINIC | Facility: CLINIC | Age: 74
End: 2024-02-12
Payer: COMMERCIAL

## 2024-02-12 DIAGNOSIS — H91.90 HEARING LOSS, UNSPECIFIED HEARING LOSS TYPE, UNSPECIFIED LATERALITY: Primary | ICD-10-CM

## 2024-04-12 ENCOUNTER — PATIENT MESSAGE (OUTPATIENT)
Dept: FAMILY MEDICINE | Facility: CLINIC | Age: 74
End: 2024-04-12
Payer: COMMERCIAL

## 2024-04-12 DIAGNOSIS — E78.2 MIXED HYPERLIPIDEMIA: ICD-10-CM

## 2024-04-12 DIAGNOSIS — R74.8 ELEVATED CPK: Primary | ICD-10-CM

## 2024-04-12 DIAGNOSIS — R79.89 LOW TESTOSTERONE IN MALE: ICD-10-CM

## 2024-04-22 ENCOUNTER — PATIENT MESSAGE (OUTPATIENT)
Dept: FAMILY MEDICINE | Facility: CLINIC | Age: 74
End: 2024-04-22
Payer: COMMERCIAL

## 2024-07-31 ENCOUNTER — PATIENT MESSAGE (OUTPATIENT)
Dept: RESEARCH | Facility: HOSPITAL | Age: 74
End: 2024-07-31
Payer: COMMERCIAL